# Patient Record
Sex: FEMALE | Race: BLACK OR AFRICAN AMERICAN | NOT HISPANIC OR LATINO | URBAN - METROPOLITAN AREA
[De-identification: names, ages, dates, MRNs, and addresses within clinical notes are randomized per-mention and may not be internally consistent; named-entity substitution may affect disease eponyms.]

---

## 2024-06-17 ENCOUNTER — APPOINTMENT (OUTPATIENT)
Dept: AUDIOLOGY | Facility: CLINIC | Age: 64
End: 2024-06-17
Payer: MEDICAID

## 2024-06-17 ENCOUNTER — OFFICE VISIT (OUTPATIENT)
Dept: OTOLARYNGOLOGY | Facility: CLINIC | Age: 64
End: 2024-06-17
Payer: MEDICAID

## 2024-06-17 VITALS — BODY MASS INDEX: 38.65 KG/M2 | WEIGHT: 232 LBS | HEIGHT: 65 IN

## 2024-06-17 DIAGNOSIS — H92.03 OTALGIA OF BOTH EARS: ICD-10-CM

## 2024-06-17 DIAGNOSIS — H61.23 BILATERAL IMPACTED CERUMEN: ICD-10-CM

## 2024-06-17 DIAGNOSIS — H90.3 SENSORINEURAL HEARING LOSS (SNHL) OF BOTH EARS: Primary | ICD-10-CM

## 2024-06-17 DIAGNOSIS — R13.10 DYSPHAGIA, UNSPECIFIED TYPE: ICD-10-CM

## 2024-06-17 PROBLEM — E66.01 OBESITY, CLASS III, BMI 40-49.9 (MORBID OBESITY) (MULTI): Status: ACTIVE | Noted: 2018-07-05

## 2024-06-17 PROBLEM — J96.01 ACUTE RESPIRATORY FAILURE WITH HYPOXIA (MULTI): Status: ACTIVE | Noted: 2021-11-12

## 2024-06-17 PROBLEM — R73.9 HYPERGLYCEMIA: Status: ACTIVE | Noted: 2018-05-15

## 2024-06-17 PROBLEM — I50.9 CHF (CONGESTIVE HEART FAILURE) (MULTI): Status: ACTIVE | Noted: 2021-11-12

## 2024-06-17 PROBLEM — J44.9 COPD (CHRONIC OBSTRUCTIVE PULMONARY DISEASE) (MULTI): Status: ACTIVE | Noted: 2023-06-02

## 2024-06-17 PROBLEM — L29.9 EAR ITCH: Status: ACTIVE | Noted: 2024-06-17

## 2024-06-17 PROBLEM — R79.89 ELEVATED LIVER FUNCTION TESTS: Status: ACTIVE | Noted: 2022-11-07

## 2024-06-17 PROBLEM — E11.9 TYPE 2 DIABETES MELLITUS WITHOUT COMPLICATION (MULTI): Status: ACTIVE | Noted: 2018-05-15

## 2024-06-17 PROBLEM — R10.9 ABDOMINAL PAIN: Status: ACTIVE | Noted: 2022-11-07
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
## 2024-06-17 PROBLEM — R07.9 CHEST PAIN IN ADULT: Status: ACTIVE | Noted: 2017-10-04

## 2024-06-17 PROBLEM — F20.9: Status: ACTIVE | Noted: 2018-05-15

## 2024-06-17 PROBLEM — U07.1 COVID-19: Status: ACTIVE | Noted: 2021-11-12

## 2024-06-17 PROBLEM — H91.90 DECREASED HEARING: Status: ACTIVE | Noted: 2024-06-17

## 2024-06-17 PROBLEM — R09.02 HYPOXIA: Status: ACTIVE | Noted: 2021-11-04

## 2024-06-17 PROBLEM — R73.9 STEROID-INDUCED HYPERGLYCEMIA: Status: ACTIVE | Noted: 2021-11-20

## 2024-06-17 PROBLEM — R71.8 RBC MICROCYTOSIS: Status: ACTIVE | Noted: 2022-11-07

## 2024-06-17 PROBLEM — J18.9 PNEUMONIA: Status: ACTIVE | Noted: 2021-11-12

## 2024-06-17 PROBLEM — K21.9 GASTROESOPHAGEAL REFLUX DISEASE: Status: ACTIVE | Noted: 2018-05-15

## 2024-06-17 PROBLEM — R19.8 CHANGE IN BOWEL FUNCTION: Status: ACTIVE | Noted: 2022-11-07

## 2024-06-17 PROBLEM — F20.9 SCHIZOPHRENIA (MULTI): Status: ACTIVE | Noted: 2018-05-16

## 2024-06-17 PROBLEM — T38.0X5A STEROID-INDUCED HYPERGLYCEMIA: Status: ACTIVE | Noted: 2021-11-20

## 2024-06-17 PROBLEM — H61.893 DRY BOTH EAR CANALS: Status: ACTIVE | Noted: 2024-06-17

## 2024-06-17 PROBLEM — I25.84 CORONARY ARTERY DISEASE DUE TO CALCIFIED CORONARY LESION: Status: ACTIVE | Noted: 2023-10-30

## 2024-06-17 PROBLEM — J96.11 CHRONIC RESPIRATORY FAILURE WITH HYPOXIA (MULTI): Status: ACTIVE | Noted: 2022-08-09

## 2024-06-17 PROBLEM — Z86.73 HISTORY OF STROKE: Status: ACTIVE | Noted: 2017-06-09

## 2024-06-17 PROBLEM — I25.10 CORONARY ARTERY DISEASE DUE TO CALCIFIED CORONARY LESION: Status: ACTIVE | Noted: 2023-10-30

## 2024-06-17 PROBLEM — I10 BENIGN ESSENTIAL HTN: Status: ACTIVE | Noted: 2021-11-12

## 2024-06-17 PROBLEM — E78.5 DYSLIPIDEMIA: Status: ACTIVE | Noted: 2018-05-15

## 2024-06-17 PROBLEM — Z86.16 HISTORY OF SEVERE ACUTE RESPIRATORY SYNDROME CORONAVIRUS 2 (SARS-COV-2) DISEASE: Status: ACTIVE | Noted: 2022-08-09

## 2024-06-17 PROBLEM — J84.9 ILD (INTERSTITIAL LUNG DISEASE) (MULTI): Status: ACTIVE | Noted: 2022-08-09

## 2024-06-17 PROCEDURE — 92557 COMPREHENSIVE HEARING TEST: CPT | Performed by: AUDIOLOGIST

## 2024-06-17 PROCEDURE — 1036F TOBACCO NON-USER: CPT | Performed by: STUDENT IN AN ORGANIZED HEALTH CARE EDUCATION/TRAINING PROGRAM

## 2024-06-17 PROCEDURE — 69210 REMOVE IMPACTED EAR WAX UNI: CPT | Performed by: STUDENT IN AN ORGANIZED HEALTH CARE EDUCATION/TRAINING PROGRAM

## 2024-06-17 PROCEDURE — 99214 OFFICE O/P EST MOD 30 MIN: CPT | Performed by: STUDENT IN AN ORGANIZED HEALTH CARE EDUCATION/TRAINING PROGRAM

## 2024-06-17 PROCEDURE — 92567 TYMPANOMETRY: CPT | Performed by: AUDIOLOGIST

## 2024-06-17 RX ORDER — LORATADINE 10 MG/1
CAPSULE, LIQUID FILLED ORAL
COMMUNITY
Start: 2021-07-29

## 2024-06-17 RX ORDER — ASCORBIC ACID 500 MG
500 TABLET ORAL
COMMUNITY

## 2024-06-17 RX ORDER — FLASH GLUCOSE SENSOR
KIT MISCELLANEOUS AS NEEDED
COMMUNITY
Start: 2024-05-28

## 2024-06-17 RX ORDER — AMLODIPINE BESYLATE 5 MG/1
1 TABLET ORAL DAILY
COMMUNITY
Start: 2024-01-02

## 2024-06-17 RX ORDER — PALIPERIDONE 3 MG/1
3 TABLET, EXTENDED RELEASE ORAL
COMMUNITY

## 2024-06-17 RX ORDER — LIDOCAINE 50 MG/G
1 PATCH TOPICAL DAILY
COMMUNITY
Start: 2024-05-30

## 2024-06-17 RX ORDER — MELOXICAM 15 MG/1
15 TABLET ORAL DAILY
COMMUNITY
Start: 2021-07-29

## 2024-06-17 RX ORDER — TOPIRAMATE 200 MG/1
200 TABLET ORAL NIGHTLY
COMMUNITY

## 2024-06-17 RX ORDER — FLUTICASONE PROPIONATE 110 UG/1
1 AEROSOL, METERED RESPIRATORY (INHALATION) 2 TIMES DAILY
COMMUNITY

## 2024-06-17 RX ORDER — CETIRIZINE HYDROCHLORIDE 10 MG/1
1 TABLET, FILM COATED ORAL
COMMUNITY
Start: 2024-05-30

## 2024-06-17 RX ORDER — METFORMIN HYDROCHLORIDE 1000 MG/1
500 TABLET ORAL
COMMUNITY
Start: 2023-11-30

## 2024-06-17 RX ORDER — LANCETS 33 GAUGE
EACH MISCELLANEOUS
COMMUNITY
Start: 2024-05-30

## 2024-06-17 RX ORDER — PALIPERIDONE PALMITATE 117 MG/.75ML
INJECTION INTRAMUSCULAR
COMMUNITY
Start: 2021-07-29

## 2024-06-17 RX ORDER — NITROGLYCERIN 0.4 MG/1
0.4 TABLET SUBLINGUAL EVERY 5 MIN PRN
COMMUNITY

## 2024-06-17 RX ORDER — DOCUSATE SODIUM 100 MG/1
100 CAPSULE, LIQUID FILLED ORAL 2 TIMES DAILY
COMMUNITY

## 2024-06-17 RX ORDER — ALBUTEROL SULFATE 90 UG/1
2 AEROSOL, METERED RESPIRATORY (INHALATION) EVERY 4 HOURS PRN
COMMUNITY

## 2024-06-17 RX ORDER — AMMONIUM LACTATE 12 G/100G
CREAM TOPICAL AS NEEDED
COMMUNITY
Start: 2016-02-24

## 2024-06-17 RX ORDER — HYDROXYZINE HYDROCHLORIDE 25 MG/1
25 TABLET, FILM COATED ORAL EVERY 6 HOURS PRN
COMMUNITY

## 2024-06-17 RX ORDER — ATORVASTATIN CALCIUM 20 MG/1
1 TABLET, FILM COATED ORAL
COMMUNITY
Start: 2024-05-30

## 2024-06-17 RX ORDER — HYDROXYZINE PAMOATE 25 MG/1
25 CAPSULE ORAL 2 TIMES DAILY
COMMUNITY

## 2024-06-17 RX ORDER — EPINEPHRINE 0.3 MG/.3ML
1 INJECTION SUBCUTANEOUS ONCE AS NEEDED
COMMUNITY
Start: 2023-10-16

## 2024-06-17 RX ORDER — NORTRIPTYLINE HYDROCHLORIDE 25 MG/1
25 CAPSULE ORAL NIGHTLY
COMMUNITY

## 2024-06-17 RX ORDER — INSULIN ASPART 100 [IU]/ML
10 INJECTION, SOLUTION INTRAVENOUS; SUBCUTANEOUS
COMMUNITY
Start: 2024-06-04 | End: 2024-06-18

## 2024-06-17 RX ORDER — ACETAMINOPHEN 500 MG
500 TABLET ORAL 2 TIMES DAILY
COMMUNITY

## 2024-06-17 RX ORDER — TIZANIDINE 4 MG/1
4 TABLET ORAL EVERY 6 HOURS PRN
COMMUNITY

## 2024-06-17 RX ORDER — FLUTICASONE PROPIONATE 50 MCG
1 SPRAY, SUSPENSION (ML) NASAL DAILY
COMMUNITY

## 2024-06-17 RX ORDER — BENZTROPINE MESYLATE 0.5 MG/1
0.5 TABLET ORAL 2 TIMES DAILY
COMMUNITY

## 2024-06-17 RX ORDER — ISOSORBIDE MONONITRATE 120 MG/1
120 TABLET, EXTENDED RELEASE ORAL DAILY
COMMUNITY
Start: 2024-06-10

## 2024-06-17 RX ORDER — GLUCAGON INJECTION, SOLUTION 1 MG/.2ML
INJECTION, SOLUTION SUBCUTANEOUS
COMMUNITY

## 2024-06-17 RX ORDER — VALBENAZINE 40 MG/1
40 CAPSULE ORAL DAILY
COMMUNITY

## 2024-06-17 RX ORDER — HYDROCHLOROTHIAZIDE 25 MG/1
1 TABLET ORAL
COMMUNITY
Start: 2024-05-30

## 2024-06-17 RX ORDER — PEN NEEDLE, DIABETIC 32GX 5/32"
NEEDLE, DISPOSABLE MISCELLANEOUS
COMMUNITY
Start: 2024-04-26

## 2024-06-17 RX ORDER — PANTOPRAZOLE SODIUM 40 MG/1
40 TABLET, DELAYED RELEASE ORAL DAILY
COMMUNITY

## 2024-06-17 RX ORDER — INSULIN GLARGINE 100 [IU]/ML
INJECTION, SOLUTION SUBCUTANEOUS EVERY MORNING
COMMUNITY

## 2024-06-17 RX ORDER — B-COMPLEX WITH VITAMIN C
1 TABLET ORAL
COMMUNITY
Start: 2023-08-30

## 2024-06-17 RX ORDER — IPRATROPIUM BROMIDE AND ALBUTEROL SULFATE 2.5; .5 MG/3ML; MG/3ML
1 SOLUTION RESPIRATORY (INHALATION) EVERY 4 HOURS PRN
COMMUNITY
Start: 2023-05-03

## 2024-06-17 RX ORDER — DULAGLUTIDE 3 MG/.5ML
3 INJECTION, SOLUTION SUBCUTANEOUS
COMMUNITY

## 2024-06-17 RX ORDER — GUAIFENESIN AND PSEUDOEPHEDRINE HYDROCHLORIDE 600; 60 MG/1; MG/1
1 TABLET, EXTENDED RELEASE ORAL EVERY 12 HOURS
COMMUNITY
Start: 2024-05-28

## 2024-06-17 RX ORDER — TRAZODONE HYDROCHLORIDE 50 MG/1
50 TABLET ORAL
COMMUNITY

## 2024-06-17 RX ORDER — METOPROLOL SUCCINATE 100 MG/1
100 TABLET, EXTENDED RELEASE ORAL DAILY
COMMUNITY

## 2024-06-17 NOTE — PROGRESS NOTES
COMPREHENSIVE AUDIOMETRIC EVALUATION      Name:  Caprice Louis  :  1960  Age:  63 y.o.  Date of Evaluation:  24   Referring Provider:  Dr. Mckinney     History:  Ms. Narendra Louis was seen today  for an evaluation of hearing.  Patient reported otalgia, bilaterally.  Please recall, patient has sensorineural hearing loss, bilaterally, for which she utilizes hearing aids. Please see Dr. Mckinney report for further case hx.    See audiometric evaluation at end of this report or scanned under media tab    OTOSCOPY:       Right Ear: Significant though non-occluding cerumen       Left Ear: Minimal non-occluding cerumen    226 Hz TYMPANOMETRY:       Right Ear: Type A: normal peak pressure, compliance, and ear canal volume, consistent with middle ear function within normal limits       Left Ear: Type A: normal peak pressure, compliance, and ear canal volume, consistent with middle ear function within normal limits    AUDIOMETRIC EVALUATION (Phones):       Right Ear: Mild through 2000 Hz sloping to Moderately severe, Sensorineural hearing loss                 Left Ear: Normal through 1000 Hz sloping to Moderate, Sensorineural hearing loss       NOTE: Improvement in right hearing sensitivity from 9575-9786 Hz        Test technique:  Standard Audiometry  Reliability:   good to fair    SPEECH RECOGNITION THRESHOLD:       Right Ear:  25 dBHL in good agreement with PTA       Left Ear:  20 dBHL in good agreement with PTA    WORD RECOGNITION:       Right Ear:  excellent (100%) at normal presentation level       Left Ear:  excellent (100%) at normal presentation level    DISCUSSION:   Discussed results and recommendations with patient and caregiver.  Questions were addressed and the patient was encouraged to contact our department should concerns arise.    RECOMMENDATIONS:  -Recommend patient return should concerns for changes in hearing sensitivity arise or as medically indicated.   -Recommend patient return to  dispensing audiologist/hearing care provider for reprogramming of hearing aids    Gab Adams, CCC-A     Appt: 2:00 - 2:30 PM

## 2024-06-17 NOTE — PROGRESS NOTES
Assessment  Bilateral sensorineural hearing loss   Dysphagia      Plan   - SNHL, cerumen impaction  Bilateral cerumen impaction removed  Audiogram performed today showed bilateral sensorineural hearing loss.  With slight fluctuation/improvement in her right ear from 1000 to 2000 Hz.    She is using hearing aids with benefit.   We will monitor her hearing with yearly audiograms    - Dysphagia  MBS/esophagram showing trace penetration that clears with thin liquids and nectar consistencies.  The patient reports she currently has no oropharyngeal complaints or dysphagia. States her dysphagia is occasional and related to soda/caffeine intake which she has learned to manage. Esophagram showing few tertiary contractions, otherwise unremarkable.     RTC 1y or sooner as needed.           History of Present Illness  6/27/24  The patient present for follow-up, reports mostly stable hearing for the past year.  Using hearing aids bilaterally with benefit.  Denies current dysphagia or episodes of aspiration.  No oropharyngeal complaints.  6/14/23  The patient present for follow-up. MBS/esophagram reviewed with the patient showing trace penetration that clears with thin liquids and nectar consistencies. Has learned to control her dysphagia by altering her dietary habits. States caffeine and soda increase/ trigger her coughing/dysphagia. Denies aspiration events. Denies neck pain, odynophagia, neck lumps or bumps, fever, chills, night sweats, weight loss.  Esophagram showing few tertiary contractions, otherwise normal study.   5/2/23  The patient presents today for evaluation of dysphagia.  Reports choking with liquids for the past 2 months, states this produces coughing along with episodes of nausea and emesis. Denies issues with solid p.o.  Denies neck pain, neck lumps or bumps, odynophagia, oral bleeding/hemoptysis, fevers, chills, night sweats, weight loss.  Endorses bilateral cerumen impaction interfering with her hearing,  otherwise no otologic complaints.  Recall 7/29/21  60-year-old female presenting for initial evaluation of bilateral ear discomfort and itching.  The patient reports developing bilateral ear pain starting 1 month ago, at which time she was evaluated and diagnosed with bilateral external otitis, received an ofloxacin course with resolution for infection.  Current symptoms include bilateral ear canal itching with associated discomfort. In addition she reports sensitivity to wind triggering brief episodes of sharp ear pain.   The patient reports using Q-tips /instrumenting her ear canals multiple times a day  Also endorses progressive bilateral hearing loss which has worsened for the past 2 years. Last audiogram performed 2014 reviewed showing mild high-frequency sensorineural hearing loss.  Denies vertigo, discharge from ear, tinnitus, aural fullness or autophony.   Denies past otological history including history of prior ear surgery, noise exposure, exposure to ototoxic drugs or agents, and/or family history of hearing loss.    History reviewed. No pertinent past medical history.    History reviewed. No pertinent surgical history.      No current outpatient medications on file prior to visit.     No current facility-administered medications on file prior to visit.        Allergies   Allergen Reactions    Aspirin Other and Palpitations     Palpitations    Other reaction(s): Other: See Comments   Palpitations    Clopidogrel Hives, Other and Palpitations     Heart arrhythmia    Carvedilol Hives, Other and Palpitations     Rash and shortness of breath     Other reaction(s): Other: See Comments   Rash and shortness of breath    Lisinopril Hives, Other and Palpitations     Off Balance, Blacking out    Other reaction(s): Other: See Comments   Off Balance, Blacking out   Severe palpitations    Penicillins Hives and Palpitations    Pantoprazole Unknown    Gelatin Other and Rash     Chest pain    Other reaction(s): Other: See  Comments   Chest pain        Review of Systems  A detailed 12 point ROS was performed and is negative except as noted in the intake form, HPI and/or Past Medical History        Physical Exam   CONSTITUTIONAL: Well developed, NAD  VOICE: Normal voice quality  RESPIRATION: Breathing comfortably, no stridor.  CV: No clubbing/cyanosis/edema in hands.  EYES: EOM Intact, sclera normal.  NEURO: Alert and oriented times 3, Cranial nerves V,VII intact and symmetric bilaterally.  HEAD AND FACE: Symmetric facial features, no masses or lesions, sinuses nontender to palpation.  SALIVARY GLANDS: Parotid and submandibular glands normal bilaterally.  + EARS: Normal external ears  Bilateral EACs with cerumen impaction (removed/see procedure note)  Right EAC patent, tympanic membrane intact  Left EAC patent, tympanic membrane intact   NOSE: External nose midline, anterior rhinoscopy is normal with limited visualization to the anterior aspect of the interior turbinates. No lesions noted.  ORAL CAVITY/OROPHARYNX/LIPS: Normal mucous membranes, normal floor of mouth/tongue/OP, no masses or lesions are noted.  PHARYNGEAL WALLS AND NASOPHARYNX: No masses noted. Mucosa appears clean and moist  NECK/LYMPH: No LAD, no thyroid masses. Trachea palpably midline  SKIN: Neck skin is without injury  PSYCH: Alert and oriented with appropriate mood and affect     Procedure: Removal of impacted cerumen, bilateral  Indication: Cerumen impaction.   The procedure was reviewed and explained.  Verbal consent was obtained.  With the use of the otoscope the right ear was examined, cerumen was cleaned with the use of curettes.  Attention was turned to the left ear, with the use of the otoscope the left ear was examined, cerumen was cleaned with the use of curettes. The patient tolerated the procedure well.       Results:   Audiogram performed today reviewed  Right mild/normal hearing up to 1000 Hz sloping to moderate sensorineural hearing loss.  Speech  discrimination score 100%.  Type a tympanogram.  Left normal hearing up to 1000 Hz sloping to moderate sensorineural hearing loss.  Speech discrimination score 100%.  Type a tympanogram.

## 2025-06-18 ENCOUNTER — APPOINTMENT (OUTPATIENT)
Dept: AUDIOLOGY | Facility: CLINIC | Age: 65
End: 2025-06-18
Payer: MEDICAID

## 2025-06-18 ENCOUNTER — APPOINTMENT (OUTPATIENT)
Dept: OTOLARYNGOLOGY | Facility: CLINIC | Age: 65
End: 2025-06-18
Payer: MEDICAID